# Patient Record
Sex: MALE | Race: WHITE | URBAN - METROPOLITAN AREA
[De-identification: names, ages, dates, MRNs, and addresses within clinical notes are randomized per-mention and may not be internally consistent; named-entity substitution may affect disease eponyms.]

---

## 2021-05-05 ENCOUNTER — OFFICE VISIT (OUTPATIENT)
Dept: ENT CLINIC | Age: 86
End: 2021-05-05
Payer: MEDICARE

## 2021-05-05 VITALS
OXYGEN SATURATION: 98 % | SYSTOLIC BLOOD PRESSURE: 122 MMHG | BODY MASS INDEX: 26.41 KG/M2 | WEIGHT: 195 LBS | RESPIRATION RATE: 19 BRPM | DIASTOLIC BLOOD PRESSURE: 70 MMHG | HEART RATE: 100 BPM | HEIGHT: 72 IN | TEMPERATURE: 97.1 F

## 2021-05-05 DIAGNOSIS — B02.30 HERPES ZOSTER WITH OPHTHALMIC COMPLICATION, UNSPECIFIED HERPES ZOSTER EYE DISEASE: ICD-10-CM

## 2021-05-05 DIAGNOSIS — H61.23 BILATERAL IMPACTED CERUMEN: ICD-10-CM

## 2021-05-05 DIAGNOSIS — C44.219 BASAL CELL CARCINOMA (BCC) OF SKIN OF LEFT EAR: Primary | ICD-10-CM

## 2021-05-05 PROCEDURE — G8510 SCR DEP NEG, NO PLAN REQD: HCPCS | Performed by: OTOLARYNGOLOGY

## 2021-05-05 PROCEDURE — 69210 REMOVE IMPACTED EAR WAX UNI: CPT | Performed by: OTOLARYNGOLOGY

## 2021-05-05 PROCEDURE — 99203 OFFICE O/P NEW LOW 30 MIN: CPT | Performed by: OTOLARYNGOLOGY

## 2021-05-05 PROCEDURE — G8536 NO DOC ELDER MAL SCRN: HCPCS | Performed by: OTOLARYNGOLOGY

## 2021-05-05 PROCEDURE — 1101F PT FALLS ASSESS-DOCD LE1/YR: CPT | Performed by: OTOLARYNGOLOGY

## 2021-05-05 PROCEDURE — G8419 CALC BMI OUT NRM PARAM NOF/U: HCPCS | Performed by: OTOLARYNGOLOGY

## 2021-05-05 PROCEDURE — G8427 DOCREV CUR MEDS BY ELIG CLIN: HCPCS | Performed by: OTOLARYNGOLOGY

## 2021-05-05 RX ORDER — LISINOPRIL 10 MG/1
TABLET ORAL
COMMUNITY
Start: 2021-04-07

## 2021-05-05 RX ORDER — PREDNISOLONE ACETATE 10 MG/ML
SUSPENSION/ DROPS OPHTHALMIC
COMMUNITY
Start: 2021-05-04

## 2021-05-05 RX ORDER — CLONIDINE HYDROCHLORIDE 0.1 MG/1
TABLET ORAL
COMMUNITY
Start: 2021-04-29

## 2021-05-05 RX ORDER — HYDRALAZINE HYDROCHLORIDE 25 MG/1
TABLET, FILM COATED ORAL
COMMUNITY
Start: 2021-04-30

## 2021-05-05 RX ORDER — ATORVASTATIN CALCIUM 40 MG/1
TABLET, FILM COATED ORAL
COMMUNITY
Start: 2021-04-10

## 2021-05-05 RX ORDER — VALACYCLOVIR HYDROCHLORIDE 1 G/1
TABLET, FILM COATED ORAL
COMMUNITY
Start: 2021-04-19

## 2021-05-05 RX ORDER — FUROSEMIDE 40 MG/1
TABLET ORAL
COMMUNITY
Start: 2021-04-10

## 2021-05-05 RX ORDER — LOSARTAN POTASSIUM 100 MG/1
TABLET ORAL
COMMUNITY
Start: 2021-01-29

## 2021-05-05 NOTE — LETTER
5/5/2021 Patient: Graham Gutierrez YOB: 1934 Date of Visit: 5/5/2021 Joseph Myers MD 
[address] [facility] [address] Via Fax: [phone] [facility] 
[address] [facility] [address] [facility] [address] Via Fax: [phone] Dear Joseph Myers MD 
Dermatology [place] [address], Thank you for referring Mr. Graham Gutierrez to [address] [place] [address], NOSE, THROAT AND ALLERGY [facility] for evaluation. My notes for this consultation are attached. If you have questions, please do not hesitate to call me. I look forward to following your patient along with you. Sincerely, Jake Wagoner MD

## 2021-05-05 NOTE — PROGRESS NOTES
Subjective:    Elsy Ray   80 y.o.   1934     New Patient Visit    Location -left ear    Quality -basal cell cancer    Severity -   moderate    Duration -months    Timing -ongoing    Context -patient found to have a ulcerative lesion on the left posterior helix seen by dermatologist had biopsy done showing basal cell skin cancer    Modifying Features -none    Associated symptoms/signs -hearing loss, history of recent shingles to the right upper face      Review of Systems  Review of Systems   Constitutional: Positive for malaise/fatigue. Negative for chills and fever. HENT: Positive for hearing loss. Negative for ear pain, nosebleeds and tinnitus. Eyes: Positive for blurred vision. Negative for double vision. Respiratory: Negative for cough, sputum production and shortness of breath. Cardiovascular: Negative for chest pain and palpitations. Gastrointestinal: Negative for heartburn, nausea and vomiting. Musculoskeletal: Positive for joint pain and myalgias. Negative for neck pain. Skin: Negative. Neurological: Positive for weakness. Negative for dizziness, speech change and headaches. Endo/Heme/Allergies: Negative for environmental allergies. Bruises/bleeds easily. Psychiatric/Behavioral: Negative for memory loss. The patient does not have insomnia. Past Medical History:   Diagnosis Date    HTN (hypertension)     MI (mitral incompetence)      Past Surgical History:   Procedure Laterality Date    HX OTHER SURGICAL      knee    HX OTHER SURGICAL      cardiac stents X2      History reviewed. No pertinent family history. Social History     Tobacco Use    Smoking status: Former Smoker    Smokeless tobacco: Never Used   Substance Use Topics    Alcohol use: Not on file      Prior to Admission medications    Medication Sig Start Date End Date Taking?  Authorizing Provider   atorvastatin (LIPITOR) 40 mg tablet  4/10/21   Provider, Historical   cloNIDine HCL (CATAPRES) 0.1 mg tablet TAKE 1 TABLET BY MOUTH EVERY 8 HOURS AS NEEDED 4/29/21   Provider, Historical   furosemide (LASIX) 40 mg tablet  4/10/21   Provider, Historical   hydrALAZINE (APRESOLINE) 25 mg tablet  4/30/21   Provider, Historical   lisinopriL (PRINIVIL, ZESTRIL) 10 mg tablet  4/7/21   Provider, Historical   losartan (COZAAR) 100 mg tablet  1/29/21   Provider, Historical   prednisoLONE acetate (PRED FORTE) 1 % ophthalmic suspension  5/4/21   Provider, Historical   valACYclovir (VALTREX) 1 gram tablet TAKE 1 TABLET BY MOUTH THREE TIMES DAILY FOR 7 DAYS 4/19/21   Provider, Historical        No Known Allergies      Objective:     Visit Vitals  /70 (BP 1 Location: Left upper arm, BP Patient Position: Sitting, BP Cuff Size: Adult)   Pulse 100   Temp 97.1 °F (36.2 °C) (Oral)   Resp 19   Ht 6' (1.829 m)   Wt 195 lb (88.5 kg)   SpO2 98%   BMI 26.45 kg/m²        Physical Exam  Vitals signs reviewed. Constitutional:       General: He is awake. Appearance: Normal appearance. He is normal weight. HENT:      Head: Normocephalic and atraumatic. Right periorbital erythema present. Jaw: There is normal jaw occlusion. No trismus, tenderness or malocclusion. Salivary Glands: Right salivary gland is not diffusely enlarged or tender. Left salivary gland is not diffusely enlarged or tender. Comments: Swelling around the right eye, abrasions above the right eye     Right Ear: Tympanic membrane, ear canal and external ear normal. Decreased hearing noted. There is impacted cerumen. Left Ear: Tympanic membrane and ear canal normal. Decreased hearing noted. There is impacted cerumen. Ears:      Cerrato exam findings: does not lateralize. Right Rinne: AC > BC. Left Rinne: AC > BC. Comments: 2 adjacent ulcerative lesions of the left posterior helix approximately 2 cm combined     Nose: No septal deviation, mucosal edema or rhinorrhea. Right Turbinates: Not enlarged, swollen or pale.       Left Turbinates: Not enlarged, swollen or pale. Right Sinus: No maxillary sinus tenderness or frontal sinus tenderness. Left Sinus: No maxillary sinus tenderness or frontal sinus tenderness. Mouth/Throat:      Lips: Pink. Mouth: Mucous membranes are moist. No oral lesions. Dentition: Normal dentition. No gum lesions. Tongue: No lesions. Palate: No mass and lesions. Pharynx: Oropharynx is clear. Uvula midline. Tonsils: No tonsillar exudate. 0 on the right. 0 on the left. Eyes:      General: Vision grossly intact. Extraocular Movements: Extraocular movements intact. Right eye: No nystagmus. Left eye: No nystagmus. Pupils: Pupils are equal, round, and reactive to light. Neck:      Musculoskeletal: Normal range of motion. No edema or erythema. Thyroid: No thyroid mass, thyromegaly or thyroid tenderness. Trachea: Trachea and phonation normal. No tracheal tenderness. Cardiovascular:      Rate and Rhythm: Normal rate and regular rhythm. Pulmonary:      Effort: Pulmonary effort is normal.      Breath sounds: Normal breath sounds. No stridor. No wheezing. Musculoskeletal: Normal range of motion. Lymphadenopathy:      Cervical: No cervical adenopathy. Skin:     General: Skin is warm and dry. Neurological:      General: No focal deficit present. Mental Status: He is alert and oriented to person, place, and time. Mental status is at baseline. Cranial Nerves: Cranial nerves are intact. Coordination: Romberg sign negative. Comments: Ambulating with a walker   Psychiatric:         Mood and Affect: Mood normal.         Behavior: Behavior normal. Behavior is cooperative. Procedure - Removal Impacted Cerumen    Indications: cerumen impaction    Ears are examined under microscope/headlight.  bilateral ears are cleaned using otologic curette, suction, and/or alligator forceps.       Assessment/Plan:     Encounter Diagnoses Name Primary?  Basal cell carcinoma (BCC) of skin of left ear Yes    Bilateral impacted cerumen     Herpes zoster with ophthalmic complication, unspecified herpes zoster eye disease      Dermatology notes/reports reviewed with patient. Discussed office excision in slow Mohs technique with frozen section. Discussed reconstruction including primary closure, rotational flap, skin grafting, and secondary intention. Questions answered. The 2 adjacent lesions will likely need to come out en bloc. I suspect we will do a wedge resection. Okay to continue with baby aspirin. Ears are cleaned bilaterally. Scheduled for June 1 in office. Orders Placed This Encounter    REMOVE IMPACTED EAR WAX    atorvastatin (LIPITOR) 40 mg tablet    cloNIDine HCL (CATAPRES) 0.1 mg tablet    furosemide (LASIX) 40 mg tablet    hydrALAZINE (APRESOLINE) 25 mg tablet    lisinopriL (PRINIVIL, ZESTRIL) 10 mg tablet    losartan (COZAAR) 100 mg tablet    prednisoLONE acetate (PRED FORTE) 1 % ophthalmic suspension    valACYclovir (VALTREX) 1 gram tablet         Thank you for referring this patient,    Mauro Giles MD, [address] [facility]  [address] [place] [address] [address]  [phone]. [phone]

## 2021-05-05 NOTE — PROGRESS NOTES
Visit Vitals  /70 (BP 1 Location: Left upper arm, BP Patient Position: Sitting, BP Cuff Size: Adult)   Pulse 100   Temp 97.1 °F (36.2 °C) (Oral)   Resp 19   Ht 6' (1.829 m)   Wt 195 lb (88.5 kg)   SpO2 98%   BMI 26.45 kg/m²     Chief Complaint   Patient presents with    New Patient     skin cancer consult

## 2021-06-01 ENCOUNTER — OFFICE VISIT (OUTPATIENT)
Dept: ENT CLINIC | Age: 86
End: 2021-06-01
Payer: MEDICARE

## 2021-06-01 ENCOUNTER — HOSPITAL ENCOUNTER (OUTPATIENT)
Dept: LAB | Age: 86
Discharge: HOME | End: 2021-06-01
Payer: MEDICARE

## 2021-06-01 VITALS
HEIGHT: 72 IN | DIASTOLIC BLOOD PRESSURE: 80 MMHG | RESPIRATION RATE: 18 BRPM | OXYGEN SATURATION: 97 % | HEART RATE: 84 BPM | WEIGHT: 195 LBS | SYSTOLIC BLOOD PRESSURE: 180 MMHG | TEMPERATURE: 97.9 F | BODY MASS INDEX: 26.41 KG/M2

## 2021-06-01 DIAGNOSIS — C44.219 BASAL CELL CARCINOMA (BCC) OF SKIN OF LEFT EAR: Primary | ICD-10-CM

## 2021-06-01 DIAGNOSIS — D48.5 NEOPLASM OF UNCERTAIN BEHAVIOR OF SKIN OF FACE: ICD-10-CM

## 2021-06-01 PROCEDURE — 88332 PATH CONSLTJ SURG EA ADD BLK: CPT

## 2021-06-01 PROCEDURE — 12051 INTMD RPR FACE/MM 2.5 CM/<: CPT | Performed by: OTOLARYNGOLOGY

## 2021-06-01 PROCEDURE — 11642 EXC F/E/E/N/L MAL+MRG 1.1-2: CPT | Performed by: OTOLARYNGOLOGY

## 2021-06-01 PROCEDURE — 88331 PATH CONSLTJ SURG 1 BLK 1SPC: CPT

## 2021-06-01 PROCEDURE — 88305 TISSUE EXAM BY PATHOLOGIST: CPT

## 2021-06-01 PROCEDURE — 11104 PUNCH BX SKIN SINGLE LESION: CPT | Performed by: OTOLARYNGOLOGY

## 2021-06-01 NOTE — PROGRESS NOTES
Excision Malignant Neoplasm of Skin with Intermediate Closure    Informed consent was obtained. The area surrounding the left posterior auricle skin lesion was cleansed with alcohol then injected with 2 mL of 1% lidocaine with 1:100,000 parts epinephrine. We then prepped the area with betadine and draped in sterile fashion. A 15 blade was used to incise the skin surrounding the lesion with gross margins, down to the perichondrium level. Scalpel and curved scissors were used to complete the excision. The specimen is suture marked for pathologic orientation and sent for frozen section. Hemostasis was achieved with bipolar cautery and a temporary pressure dressing was applied. Frozen section revealed close margin at the superior tip. Additional sliver of skin is taken and sent for permanent section. .  The excision/defect is  2 cm. Defect was just of the posterior auricular skin at the helix. Options were to excise a wedge and reconstruct this or excise some cartilage and closed primarily with a moderate bite defect of the ear. Patient agreed with the more straightforward closure. I excised additional cartilage to allow for increased flexibility of the anterior skin and then after some undermining close the wound reapproximating the skin of the helix to the posterior auricle skin edge with interrupted 4-0 nylon and more superiorly in layers with 3-0 Vicryl and 4-0 nylon. The wound was cleaned and Steri-strips and sterile pressure dressing was applied. Patient also had a relatively new firm nodular lesion around a millimeter in diameter of the right nasolabial skin crease. I prepped this area with alcohol injected additional local anesthetic and performed a 5 mm punch biopsy. Hemostasis was achieved with bipolar cautery and a bandage was applied.

## 2021-06-01 NOTE — PROGRESS NOTES
Visit Vitals  BP (!) 180/80 (BP 1 Location: Left upper arm, BP Patient Position: Sitting, BP Cuff Size: Adult)   Pulse 84   Temp 97.9 °F (36.6 °C) (Temporal)   Resp 18   Ht 6' (1.829 m)   Wt 195 lb (88.5 kg)   SpO2 97%   BMI 26.45 kg/m²     Chief Complaint   Patient presents with    Procedure

## 2021-06-07 ENCOUNTER — TELEPHONE (OUTPATIENT)
Dept: ENT CLINIC | Age: 86
End: 2021-06-07

## 2021-06-08 ENCOUNTER — TELEPHONE (OUTPATIENT)
Dept: ENT CLINIC | Age: 86
End: 2021-06-08

## 2021-06-10 ENCOUNTER — OFFICE VISIT (OUTPATIENT)
Dept: ENT CLINIC | Age: 86
End: 2021-06-10
Payer: MEDICARE

## 2021-06-10 VITALS
WEIGHT: 195 LBS | DIASTOLIC BLOOD PRESSURE: 72 MMHG | SYSTOLIC BLOOD PRESSURE: 150 MMHG | HEIGHT: 72 IN | RESPIRATION RATE: 17 BRPM | HEART RATE: 69 BPM | TEMPERATURE: 98.2 F | OXYGEN SATURATION: 97 % | BODY MASS INDEX: 26.41 KG/M2

## 2021-06-10 DIAGNOSIS — C44.219 BASAL CELL CARCINOMA (BCC) OF SKIN OF LEFT EAR: Primary | ICD-10-CM

## 2021-06-10 DIAGNOSIS — D36.11 NEUROFIBROMA OF FACE: ICD-10-CM

## 2021-06-10 PROCEDURE — 11642 EXC F/E/E/N/L MAL+MRG 1.1-2: CPT | Performed by: OTOLARYNGOLOGY

## 2021-06-10 PROCEDURE — 12051 INTMD RPR FACE/MM 2.5 CM/<: CPT | Performed by: OTOLARYNGOLOGY

## 2021-06-10 NOTE — PROGRESS NOTES
9-day follow-up after left ear basal cell excision and punch biopsy of new onset lesion of the right nasolabial fold skin. Left ear incision Steri-Strip still in place is all removed. Nylon sutures and some crusting is removed. It is healing well, ointment applied. Right nasolabial fold lesion remains with a 1 cm area of ulceration. 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 Due to the continued ulceration we discussed definitively excising this area and closing, patient and his sister want to proceed with that. Excision Benign Neoplasm of Skin of face with Intermediate Closure    The skin overlying the right nasolabial fold lesion is prepped and draped in usual sterile fashion. A total of 2mL of 1% lidocaine with [id number] parts epinephrine is injected around the lesion. An elliptical incision is made around the lesion, and taken down into the subcutaneous fat. The lesion was then sharply excised. The excision is 1.2cm in size. Once the lesion is excised, hemostasis is achieved with bipolar cautery. The wound is then closed in layers using 4-0 vicryl deep suture and 5-0 fast absorbing plain gut suture for the skin. The skin is cleaned, dried and steristrips and sterile dressing is applied. Procedure tolerated well, wound care instructions are given and discussed. Plan:    #1 basal cell carcinoma left ear -excised with negative margins, continue follow-up with dermatology. Wound care discussed. 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 Wound care discussed.

## 2021-06-10 NOTE — PROGRESS NOTES
Visit Vitals  BP (!) 150/72 (BP 1 Location: Left upper arm, BP Patient Position: Sitting, BP Cuff Size: Adult)   Pulse 69   Temp 98.2 °F (36.8 °C) (Temporal)   Resp 17   Ht 6' (1.829 m)   Wt 195 lb (88.5 kg)   SpO2 97%   BMI 26.45 kg/m²     Chief Complaint   Patient presents with    Follow-up     Basal cell carcinoma (BCC) of skin of left ear

## 2021-06-10 NOTE — LETTER
6/10/2021 Patient: Ermelinda Vázquez YOB: 1934 Date of Visit: 6/10/2021 Johanna Guerrero MD 
[facility] [address] Via Fax: [phone] [facility] [place] [address] [address]  [facility] [address] [facility] [address] Via Fax: [phone] Dear Johanna Guerrero MD 
Dermatology [facility], Thank you for referring Mr. Ermelinda Vázquez to [facility] EAR NOSE AND THROAT [address] for evaluation. My notes for this consultation are attached. If you have questions, please do not hesitate to call me. I look forward to following your patient along with you. Sincerely, Tiffanie Rodrigez MD

## 2021-06-18 LAB
CPT CODES, 490044: NORMAL
CPT DISCLAIMER: NORMAL
CYTOLOGY SPEC DOC CYTO: NORMAL
DIAGNOSIS SYNOPSIS:: NORMAL
DX ICD CODE: NORMAL
DX ICD CODE: NORMAL
PATH REPORT.GROSS SPEC: NORMAL
PATH REPORT.RELEVANT HX SPEC: NORMAL
PATHOLOGIST NAME: NORMAL
SPECIMEN SOURCE: NORMAL

## 2022-03-19 PROBLEM — H61.23 BILATERAL IMPACTED CERUMEN: Status: ACTIVE | Noted: 2021-05-05

## 2022-03-19 PROBLEM — D36.11 NEUROFIBROMA OF FACE: Status: ACTIVE | Noted: 2021-06-10

## 2022-03-19 PROBLEM — C44.219 BASAL CELL CARCINOMA (BCC) OF SKIN OF LEFT EAR: Status: ACTIVE | Noted: 2021-05-05

## 2023-05-14 RX ORDER — LISINOPRIL 10 MG/1
TABLET ORAL
COMMUNITY
Start: 2021-04-07

## 2023-05-14 RX ORDER — LOSARTAN POTASSIUM 100 MG/1
TABLET ORAL
COMMUNITY
Start: 2021-01-29

## 2023-05-14 RX ORDER — VALACYCLOVIR HYDROCHLORIDE 1 G/1
TABLET, FILM COATED ORAL
COMMUNITY
Start: 2021-04-19

## 2023-05-14 RX ORDER — HYDRALAZINE HYDROCHLORIDE 25 MG/1
TABLET, FILM COATED ORAL
COMMUNITY
Start: 2021-04-30

## 2023-05-14 RX ORDER — PREDNISOLONE ACETATE 10 MG/ML
SUSPENSION/ DROPS OPHTHALMIC
COMMUNITY
Start: 2021-05-04

## 2023-05-14 RX ORDER — CLONIDINE HYDROCHLORIDE 0.1 MG/1
1 TABLET ORAL EVERY 8 HOURS PRN
COMMUNITY
Start: 2021-04-29

## 2023-05-14 RX ORDER — ATORVASTATIN CALCIUM 40 MG/1
TABLET, FILM COATED ORAL
COMMUNITY
Start: 2021-04-10

## 2023-05-14 RX ORDER — FUROSEMIDE 40 MG/1
TABLET ORAL
COMMUNITY
Start: 2021-04-10